# Patient Record
Sex: MALE | Race: OTHER | URBAN - METROPOLITAN AREA
[De-identification: names, ages, dates, MRNs, and addresses within clinical notes are randomized per-mention and may not be internally consistent; named-entity substitution may affect disease eponyms.]

---

## 2019-12-20 ENCOUNTER — APPOINTMENT (RX ONLY)
Dept: URBAN - METROPOLITAN AREA CLINIC 302 | Facility: CLINIC | Age: 29
Setting detail: DERMATOLOGY
End: 2019-12-20

## 2019-12-20 DIAGNOSIS — R20.8 OTHER DISTURBANCES OF SKIN SENSATION: ICD-10-CM

## 2019-12-20 DIAGNOSIS — N48.1 BALANITIS: ICD-10-CM

## 2019-12-20 PROBLEM — L30.9 DERMATITIS, UNSPECIFIED: Status: ACTIVE | Noted: 2019-12-20

## 2019-12-20 PROCEDURE — ? ADDITIONAL NOTES

## 2019-12-20 PROCEDURE — ? COUNSELING

## 2019-12-20 PROCEDURE — 99202 OFFICE O/P NEW SF 15 MIN: CPT

## 2019-12-20 ASSESSMENT — LOCATION SIMPLE DESCRIPTION DERM: LOCATION SIMPLE: PENIS

## 2019-12-20 ASSESSMENT — LOCATION DETAILED DESCRIPTION DERM: LOCATION DETAILED: VENTRAL PENILE SHAFT

## 2019-12-20 ASSESSMENT — LOCATION ZONE DERM: LOCATION ZONE: PENIS

## 2019-12-20 NOTE — PROCEDURE: ADDITIONAL NOTES
Additional Notes: Favor Zoon’s balanitis, but pt also reports taking NSAIDS and cleaning the area with baby wipes, so FDE and allergic contact dermatitis are in DDx.\\nI advised pt to avoid NSAIDS, change to water wipes.  I gave patient samples of Impoyz with strict written instructions to apply once daily for one week, then no more than 1-2 times per week at most, PRN.  If condition does not resolve with those measures, or if recurrent, I recommended he see a urologist to discuss circumcision as that procedure has been reported to be curative.
Detail Level: Simple